# Patient Record
Sex: FEMALE | Race: WHITE
[De-identification: names, ages, dates, MRNs, and addresses within clinical notes are randomized per-mention and may not be internally consistent; named-entity substitution may affect disease eponyms.]

---

## 2024-12-16 ENCOUNTER — HOSPITAL ENCOUNTER (OUTPATIENT)
Dept: HOSPITAL 100 - LAB | Age: 34
Discharge: HOME | End: 2024-12-16
Payer: COMMERCIAL

## 2024-12-16 DIAGNOSIS — Z87.898: ICD-10-CM

## 2024-12-16 DIAGNOSIS — L30.4: ICD-10-CM

## 2024-12-16 DIAGNOSIS — L90.9: Primary | ICD-10-CM

## 2024-12-16 LAB
ANION GAP: 1 (ref 5–15)
BUN SERPL-MCNC: 11 MG/DL (ref 7–18)
BUN/CREAT RATIO: 11.7 RATIO (ref 10–20)
CALCIUM SERPL-MCNC: 9.2 MG/DL (ref 8.5–10.1)
CARBON DIOXIDE: 28 MMOL/L (ref 21–32)
CHLORIDE: 108 MMOL/L (ref 98–107)
DEPRECATED RDW RBC: 41.2 FL (ref 35.1–43.9)
ERYTHROCYTE [DISTWIDTH] IN BLOOD: 12.2 % (ref 11.6–14.6)
EST GLOM FILT RATE - AFR AMER: 87 ML/MIN (ref 60–?)
GLUCOSE: 86 MG/DL (ref 74–106)
HCT VFR BLD AUTO: 43.9 % (ref 37–47)
HEMOGLOBIN: 14.8 G/DL (ref 12–15)
HGB BLD-MCNC: 14.8 G/DL (ref 12–15)
MCV RBC: 91.5 FL (ref 81–99)
MEAN CORP HGB CONC: 33.7 G/DL (ref 32–36)
MEAN PLATELET VOL.: 10.4 FL (ref 6.2–12)
PLATELET # BLD: 267 K/MM3 (ref 150–450)
PLATELET COUNT: 267 K/MM3 (ref 150–450)
POTASSIUM: 4.1 MMOL/L (ref 3.5–5.1)
RBC # BLD AUTO: 4.8 M/MM3 (ref 4.2–5.4)
RBC DISTRIBUTION WIDTH CV: 12.2 % (ref 11.6–14.6)
RBC DISTRIBUTION WIDTH SD: 41.2 FL (ref 35.1–43.9)
WBC # BLD AUTO: 8 K/MM3 (ref 4.4–11)
WHITE BLOOD COUNT: 8 K/MM3 (ref 4.4–11)

## 2024-12-16 PROCEDURE — 85027 COMPLETE CBC AUTOMATED: CPT

## 2024-12-16 PROCEDURE — 80048 BASIC METABOLIC PNL TOTAL CA: CPT

## 2024-12-16 PROCEDURE — 36415 COLL VENOUS BLD VENIPUNCTURE: CPT

## 2025-02-20 NOTE — PAT.ANESEVAL
Pre-Assessment
Diagnosis/Proposed Procedure
Planned Operative Procedure(s): Abdominal Panniculectomy
Anesthesia History
Anesthesia History - RN Documentation: 
Anesthesia History - RN Documentation

Hx Hospitalization             Yes: 24 LAP [facility]      25 09:22
Any Problems With Anesthesia   Yes: NAUSEA                  25 09:22
Cholinesterase deficiency      No                           25 09:22
You/Your Family Experience     No                           25 09:22
fever (hyperthermia) with       
Relationship                    
Recent Exposure to Contagious   
Disease                         
Does patient have nerve        No                           25 09:22
stimulator                      
Patient instructed to have      
device shut off                 
--Does patient have Pacemaker   
or ICD?                         
When Was Last Pacemaker Check   


*** QUESTION #4 FULL TEXT: You/Your Family Experience fever (hyperthermia) with Anesthesia

Last Oral Intake
Last Oral intake: 
Last Oral Intake

NPO since                       
Meds taken in AM with sips of   
water?                          
Meds patient instructed to      
take am of surgery              



PONV
PONV - RN Documentation: 
PONV - RN Documentation

Female                         Yes                          25 09:22
HX of Motion Sickness          Yes                          25 09:22
HX of N/V After Surgery        Yes                          25 09:22
Non-Smoker                     Yes                          25 09:22
Duration of Surgery greater    Yes                          25 09:22
than 60 minutes                 
Number of Risk Factors         5                            25 09:22
PONV Score                     Severe Risk                  25 09:22



Height & Weight
Height & Weight: 
Anesthesia: Height & Weight

Height                         5 ft 6 in                    25 10:26



Respiratory Assessment
Respiratory Assessment - RN Documentation: 
Respiratory Tract Infection Hx - RN Documentation

Hx Respiratory Tract Infection No                           25 09:22



STOP Sleep Apnea
STOP Sleep Apnea - RN Documentation: 
STOP Sleep Apnea - RN Documentation

Hx Hypertension                No                           25 09:22
Hx Sleep Apnea                 No                           25 09:22
CPAP                            
BIPAP                           
Do you snore loudly (louder    No                           25 09:22
than talking or can be heard    
Do you often feel tired/       No                           25 09:22
fatigued/ sleepy during         
daytime?                        
Has anyone observed you stop   No                           25 09:22
breathing during sleep?         
STOP Results                   Negative                     25 09:22



*** QUESTION #5 FULL TEXT : Do you snore loudly (louder than talking or can be heard through closed doors)? 

Tobacco Use History
Tobacco Use History - RN Documentation: 
Tobacco Use History - RN Documentation

Tobacco Use                     
Smoking Status                 Never smoker                 25 09:22
Hx Tobacco Use                 No                           25 09:22
Years Smoking                   
Packs Smoked per Day            
Smoking Cessation Date was      
within the last 15 years        
Hx Smoking Cessation Date       
Hx Smoking Cessation            
Counseling                      



Hematologic Medial History
Hematologic Hx - RN Documentation: 
Hematologic Medical Hx - RN documentation

Hx of Blood Transfusion        Yes                          25 09:22
Hx of Transfusion in last 3    No                           25 09:22
Months                          
Date of Last Transfusion (if    
within last 3 months)           
Ever experience any problems   No                           25 09:22
with transfusion(s)?            
Specify any problems            
Hx of Preganancy in last 3     No                           25 09:22
Months                          
Nurse Filling Out Transfusion  VCHRISTIN                    25 09:22
& Pregnancy Questions:          
Date:                          25 09:22
Time:                          09:23                        25 09:22
Patient unable to answer at     
this time (ie. confused,        
unrespo                         



Pregnancy/Reproduction History
Pregnancy/Reproductive History - RN Documentation: 
Pregnancy/Reproductive Hx- RN Documentation

Hx Pregnant Now                No                           25 09:22
Gestational Age (in weeks):     
EDC:                            
Hx                       
Hx Para                         
Hx  Section             
SAB                             
Breastfeeding                  No                           25 09:22




Formerly [facility]
Medical History (Updated 25 @ 09:21 by Idalmis Mata)

Wears glasses
Post-menopausal
Cancer
Anxiety
Kidney stones
Migraine headache
Syncope
Cardiology follow-up encounter
History of Holter monitoring
History of echocardiogram
POTS (postural orthostatic tachycardia syndrome)
History of frequent headaches
Polycystic ovary
Hormone deficiency
History of melanoma


Home Medications

?Medication ?Instructions ?Recorded ?Last Taken ?Type
bupropion HCl 150 mg 24 hr tablet, 150 mg PO QAM 10/02/24 Unknown History
extended release    
desvenlafaxine succinate 25 mg 25 mg PO QDAY 10/02/24 Unknown History
tablet,extended release 24 hr    
spironolactone 100 mg tablet 100 mg PO QDAY 10/02/24 Unknown History
topiramate 50 mg capsule,extended 50 mg PO BID 10/02/24 Unknown History
release 24 hr    
sucralfate 1 gram tablet 1 g PO 4X/DAY 25 Unknown History
omeprazole 40 mg capsule,delayed 40 mg PO DAILY 25 Unknown History
release    
rizatriptan 10 mg tablet 10 mg PO PRN PRN migraine 25 Unknown History




Allergy/AdvReac Type Severity Reaction Status Date / Time
blueberry Allergy Severe Hives Verified 25 09:11
raspberry Allergy Severe Hives Verified 25 09:11
strawberry (strawberries) Allergy  Swelling Verified 25 09:10


Family History (Reviewed 10/02/24 @ 15:09 by Stephanie Mcghee)
Father
 Alcoholism
 Diabetes
Mother
 Anxiety


Surgical History (Updated 25 @ 09:21 by Idalmis Mata)

History of laparoscopic cholecystectomy
Hx of adenoidectomy
History of bilateral breast reduction surgery
History of hysterectomy
History of  delivery


Social History (Reviewed 10/02/24 @ 15:18 by Stephanie Bennett)
Smoking Status:  Never smoker 
alcohol intake:  never 
substance use type:  does not use 
additional social history:  no vaping, no marijuana, no edibles, no asa, ibuprofen prn 



Audit: Pertinent Findings
Pertinent Findings
EKG Perinent findings: unremarkable ECG in 
Additional pertinent findings: Normal pulmonary function 
METS>=4

Recommendation
Anesthesia Recommendation
Anesthesia recommendation: OPTIMIZED for anesthesia

## 2025-02-20 NOTE — PAT.ANE_ITS
Pre-Assessment    
Diagnosis/Proposed Procedure    
Planned Operative Procedure(s): Abdominal Panniculectomy    
Anesthesia History    
Anesthesia History - RN Documentation:     
                      Anesthesia History - RN Documentation    
    
    
    
Hx Hospitalization             Yes: 24 LAP STEFANY      25 09:22    
     
Any Problems With Anesthesia   Yes: NAUSEA                  25 09:22    
     
Cholinesterase deficiency      No                           25 09:22    
     
You/Your Family Experience     No                           25 09:22    
    
fever (hyperthermia) with           
     
Relationship                        
     
Recent Exposure to Contagious       
    
Disease                             
     
Does patient have nerve        No                           25 09:22    
    
stimulator                          
     
Patient instructed to have          
    
device shut off                     
     
--Does patient have Pacemaker       
    
or ICD?                             
     
When Was Last Pacemaker Check       
    
    
    
*** QUESTION #4 FULL TEXT: You/Your Family Experience fever (hyperthermia) with   
Anesthesia    
    
Last Oral Intake    
Last Oral intake:     
                                Last Oral Intake    
    
    
    
NPO since                           
     
Meds taken in AM with sips of       
    
water?                              
     
Meds patient instructed to          
    
take am of surgery                  
    
    
    
    
PONV    
PONV - RN Documentation:     
                             PONV - RN Documentation    
    
    
    
Female                         Yes                          25 09:22    
     
HX of Motion Sickness          Yes                          25 09:22    
     
HX of N/V After Surgery        Yes                          25 09:22    
     
Non-Smoker                     Yes                          25 09:22    
     
Duration of Surgery greater    Yes                          25 09:22    
    
than 60 minutes                     
     
Number of Risk Factors         5                            25 09:22    
     
PONV Score                     Severe Risk                  25 09:22    
    
    
    
    
Height & Weight    
Height & Weight:     
                           Anesthesia: Height & Weight    
    
    
    
Height                         5 ft 6 in                    25 10:26    
    
    
    
    
Respiratory Assessment    
Respiratory Assessment - RN Documentation:     
                Respiratory Tract Infection Hx - RN Documentation    
    
    
    
Hx Respiratory Tract Infection No                           25 09:22    
    
    
    
    
STOP Sleep Apnea    
STOP Sleep Apnea - RN Documentation:     
                       STOP Sleep Apnea - RN Documentation    
    
    
    
Hx Hypertension                No                           25 09:22    
     
Hx Sleep Apnea                 No                           25 09:22    
     
CPAP                                
     
BIPAP                               
     
Do you snore loudly (louder    No                           25 09:22    
    
than talking or can be heard        
     
Do you often feel tired/       No                           25 09:22    
    
fatigued/ sleepy during             
    
daytime?                            
     
Has anyone observed you stop   No                           25 09:22    
    
breathing during sleep?             
     
STOP Results                   Negative                     25 09:22    
    
    
    
    
*** QUESTION #5 FULL TEXT : Do you snore loudly (louder than talking or can be   
heard through closed doors)?     
    
Tobacco Use History    
Tobacco Use History - RN Documentation:     
                     Tobacco Use History - RN Documentation    
    
    
    
Tobacco Use                         
     
Smoking Status                 Never smoker                 25 09:22    
     
Hx Tobacco Use                 No                           25 09:22    
     
Years Smoking                       
     
Packs Smoked per Day                
     
Smoking Cessation Date was          
    
within the last 15 years            
     
Hx Smoking Cessation Date           
     
Hx Smoking Cessation                
    
Counseling                          
    
    
    
    
Hematologic Medial History    
Hematologic Hx - RN Documentation:     
                    Hematologic Medical Hx - RN documentation    
    
    
    
Hx of Blood Transfusion        Yes                          25 09:22    
     
Hx of Transfusion in last 3    No                           25 09:22    
    
Months                              
     
Date of Last Transfusion (if        
    
within last 3 months)               
     
Ever experience any problems   No                           25 09:22    
    
with transfusion(s)?                
     
Specify any problems                
     
Hx of Preganancy in last 3     No                           25 09:22    
    
Months                              
     
Nurse Filling Out Transfusion  VCHRISTIN                    25 09:22    
    
& Pregnancy Questions:              
     
Date:                          25 09:22    
     
Time:                          09:23                        25 09:22    
     
Patient unable to answer at         
    
this time (ie. confused,            
    
unrespo                             
    
    
    
    
Pregnancy/Reproduction History    
Pregnancy/Reproductive History - RN Documentation:     
                   Pregnancy/Reproductive Hx- RN Documentation    
    
    
    
Hx Pregnant Now                No                           25 09:22    
     
Gestational Age (in weeks):         
     
EDC:                                
     
Hx                           
     
Hx Para                             
     
Hx  Section                 
     
SAB                                 
     
Breastfeeding                  No                           25 09:22    
    
    
    
    
    
[facility] hospital    
Medical History (Updated 25 @ 09:21 by Idalmis Mata)    
    
Wears glasses    
Post-menopausal    
Cancer    
Anxiety    
Kidney stones    
Migraine headache    
Syncope    
Cardiology follow-up encounter    
History of Holter monitoring    
History of echocardiogram    
POTS (postural orthostatic tachycardia syndrome)    
History of frequent headaches    
Polycystic ovary    
Hormone deficiency    
History of melanoma    
    
    
                                Home Medications    
    
    
    
?Medication ?Instructions ?Recorded ?Last Taken ?Type    
     
                          bupropion HCl 150 mg 24 hr tablet, 150 mg PO QAM 10/02    
/24 Unknown History    
    
                                        extended release        
     
                          desvenlafaxine succinate 25 mg 25 mg PO QDAY 10/02/24     
Unknown History    
    
                                        tablet,extended release 24 hr        
     
                          spironolactone 100 mg tablet 100 mg PO QDAY 10/02/24 U    
nknown History    
     
                          topiramate 50 mg capsule,extended 50 mg PO BID 10/02/2    
4 Unknown History    
    
                                        release 24 hr        
     
                          sucralfate 1 gram tablet  1 g PO 4X/DAY 25 Unkno    
wn History    
     
                          omeprazole 40 mg capsule,delayed 40 mg PO DAILY  Unknown History    
    
                                        release        
     
                          rizatriptan 10 mg tablet  10 mg PO PRN PRN migraine  Unknown History    
    
    
    
                                            
    
    
    
Allergy/AdvReac Type Severity Reaction Status Date / Time    
     
blueberry Allergy Severe Hives Verified 25 09:11    
     
raspberry Allergy Severe Hives Verified 25 09:11    
     
strawberry (strawberries) Allergy  Swelling Verified 25 09:10    
    
    
    
Family History (Reviewed 10/02/24 @ 15:09 by Stephanie Mcghee)    
Father   Alcoholism    
   Diabetes    
Mother   Anxiety    
    
    
Surgical History (Updated 25 @ 09:21 by Idalmis Mata)    
    
History of laparoscopic cholecystectomy    
Hx of adenoidectomy    
History of bilateral breast reduction surgery    
History of hysterectomy    
History of  delivery    
    
    
Social History (Reviewed 10/02/24 @ 15:18 by Stephanie Bennett)    
Smoking Status:  Never smoker     
alcohol intake:  never     
substance use type:  does not use     
additional social history:  no vaping, no marijuana, no edibles, no asa,   
ibuprofen prn     
    
    
    
Audit: Pertinent Findings    
Pertinent Findings    
EKG Perinent findings: unremarkable ECG in     
Additional pertinent findings: Normal pulmonary function     
METS>=4    
    
Recommendation    
Anesthesia Recommendation    
Anesthesia recommendation: OPTIMIZED for anesthesia

## 2025-03-06 ENCOUNTER — HOSPITAL ENCOUNTER (OUTPATIENT)
Dept: HOSPITAL 100 - SDC | Age: 35
Discharge: HOME | End: 2025-03-06
Payer: COMMERCIAL

## 2025-03-06 VITALS — BODY MASS INDEX: 30.2 KG/M2

## 2025-03-06 VITALS
OXYGEN SATURATION: 96 % | SYSTOLIC BLOOD PRESSURE: 98 MMHG | HEART RATE: 63 BPM | RESPIRATION RATE: 16 BRPM | DIASTOLIC BLOOD PRESSURE: 55 MMHG

## 2025-03-06 VITALS
OXYGEN SATURATION: 97 % | RESPIRATION RATE: 16 BRPM | SYSTOLIC BLOOD PRESSURE: 98 MMHG | TEMPERATURE: 98.96 F | HEART RATE: 71 BPM | DIASTOLIC BLOOD PRESSURE: 65 MMHG

## 2025-03-06 VITALS
SYSTOLIC BLOOD PRESSURE: 98 MMHG | RESPIRATION RATE: 16 BRPM | OXYGEN SATURATION: 100 % | DIASTOLIC BLOOD PRESSURE: 68 MMHG | TEMPERATURE: 97.7 F | HEART RATE: 55 BPM

## 2025-03-06 VITALS
DIASTOLIC BLOOD PRESSURE: 68 MMHG | OXYGEN SATURATION: 100 % | SYSTOLIC BLOOD PRESSURE: 98 MMHG | HEART RATE: 55 BPM | TEMPERATURE: 97.7 F | RESPIRATION RATE: 16 BRPM

## 2025-03-06 VITALS
OXYGEN SATURATION: 95 % | HEART RATE: 61 BPM | RESPIRATION RATE: 16 BRPM | DIASTOLIC BLOOD PRESSURE: 68 MMHG | SYSTOLIC BLOOD PRESSURE: 89 MMHG

## 2025-03-06 VITALS
SYSTOLIC BLOOD PRESSURE: 98 MMHG | OXYGEN SATURATION: 98 % | TEMPERATURE: 98.78 F | HEART RATE: 70 BPM | RESPIRATION RATE: 16 BRPM | DIASTOLIC BLOOD PRESSURE: 54 MMHG

## 2025-03-06 VITALS
DIASTOLIC BLOOD PRESSURE: 68 MMHG | RESPIRATION RATE: 16 BRPM | SYSTOLIC BLOOD PRESSURE: 95 MMHG | TEMPERATURE: 97.7 F | HEART RATE: 71 BPM | OXYGEN SATURATION: 97 %

## 2025-03-06 VITALS
HEART RATE: 69 BPM | SYSTOLIC BLOOD PRESSURE: 98 MMHG | DIASTOLIC BLOOD PRESSURE: 49 MMHG | OXYGEN SATURATION: 92 % | TEMPERATURE: 97 F | RESPIRATION RATE: 16 BRPM

## 2025-03-06 VITALS
OXYGEN SATURATION: 96 % | SYSTOLIC BLOOD PRESSURE: 95 MMHG | DIASTOLIC BLOOD PRESSURE: 49 MMHG | RESPIRATION RATE: 16 BRPM | HEART RATE: 61 BPM | TEMPERATURE: 96.98 F

## 2025-03-06 VITALS
OXYGEN SATURATION: 96 % | SYSTOLIC BLOOD PRESSURE: 90 MMHG | DIASTOLIC BLOOD PRESSURE: 68 MMHG | HEART RATE: 63 BPM | RESPIRATION RATE: 16 BRPM

## 2025-03-06 VITALS
RESPIRATION RATE: 16 BRPM | DIASTOLIC BLOOD PRESSURE: 68 MMHG | OXYGEN SATURATION: 94 % | SYSTOLIC BLOOD PRESSURE: 98 MMHG | HEART RATE: 68 BPM

## 2025-03-06 VITALS — SYSTOLIC BLOOD PRESSURE: 98 MMHG | DIASTOLIC BLOOD PRESSURE: 68 MMHG

## 2025-03-06 DIAGNOSIS — Z87.898: ICD-10-CM

## 2025-03-06 DIAGNOSIS — L30.4: ICD-10-CM

## 2025-03-06 DIAGNOSIS — L90.9: Primary | ICD-10-CM

## 2025-03-06 DIAGNOSIS — F41.9: ICD-10-CM

## 2025-03-06 DIAGNOSIS — Z79.899: ICD-10-CM

## 2025-03-06 PROCEDURE — 00802 ANES PX LWR ABDL WAL PANNIC: CPT

## 2025-03-06 PROCEDURE — 0JB80ZZ EXCISION OF ABDOMEN SUBCUTANEOUS TISSUE AND FASCIA, OPEN APPROACH: ICD-10-PCS | Performed by: PLASTIC SURGERY

## 2025-03-06 PROCEDURE — 15830 EXC EXCESSIVE SKIN ABDOMEN: CPT

## 2025-03-06 RX ADMIN — CEFAZOLIN 400 GM: 10 INJECTION, POWDER, FOR SOLUTION INTRAVENOUS at 07:38

## 2025-03-06 NOTE — PCM.POSTANE2
Anesthesia Postop Eval I Sum
Postop Eval Completion status
Anesthesia document: Postop Eval 1 completed: Yes
Anesthesia Postop Eval I Summary
Anesthesia Postop Eval I Summary: 
Anesthesia Postop Eval I:  Assessment Summary

Airway patent                  Yes                03/06/25 11:03 [email or web address]
Spontaneous unlabored          Yes                03/06/25 11:03 [email or web address]
respirations                     
Mental status                  Awake,Calm         03/06/25 11:03 [email or web address]
nausea                         No                 03/06/25 11:03 [email or web address]
Vomiting                       No                 03/06/25 11:03 [email or web address]


Anesthesia Postop Eval I: Fluid Summary

Crystalloid volume administer  1,700              03/06/25 11:03 [email or web address]
(ml)                             
Colloids volume administered (   
ml)                              
Blood Product volume             
administered (ml)                
Total IV fluid infused         1,700              03/06/25 11:03 [email or web address]


Anesthesia Postop Eval I: Summary Notes

Anesthesia Complication        No                 03/06/25 11:03 [email or web address]
Anesthesia Complication          
Comment:                         
Post-operative progress note     




Anesthesia: Postop Eval II
Evaluation
Mental status: Awake and Calm
Pain Level: 3
nausea: No
Vomiting: No
Complications
Anesthesia Complication: No

## 2025-03-06 NOTE — EX.PCM.DISCH
Discharge Instructions
Dressing / Incision
Additional Dressing/Incision Instructions:: Follow the instructions given in the office. 
Maintain a recliner position when resting.
Follow Up Care
Please Follow Up With: Isabel Lopez MD
When: In 1 week
Test Results: 
Test results from this visit will be discussed in further detail at your follow-up appointment, if applicable.


Discharge Plan
Admission
Attending Provider: Isabel Lopez

Primary Care Provider: HEATHER ALVAREZ

Instructions
Print Language: English

Discharge Orders/Prescriptions
Prescriptions:
No Action
  desvenlafaxine succinate 25 mg tablet extended release 24 hr 
   25 mg PO QDAY 
  spironolactone 100 mg tablet 
   100 mg PO QDAY 
  topiramate 50 mg capsule,extended release 24hr 
   50 mg PO BID 
  bupropion HCl 150 mg tablet extended release 24 hr 
   150 mg PO QAM 
  sucralfate 1 gram tablet 
   1 g PO 4X/DAY 
  estradiol 0.5 mg tablet 
   0.5 mg PO QDAY 
  cephalexin 500 mg capsule 
   500 mg PO BID Qty: 14 0RF
  omeprazole 40 mg capsule,delayed release(DR/EC) 
   40 mg PO DAILY 
  rizatriptan 10 mg tablet 
   10 mg PO PRN PRN (Reason: migraine) 

Referrals / Follow Up:
HEATHER ALVAREZ MD [Primary Care Provider] - 

Disposition
Disposition (needs filled in before D/C Order can be placed): Home, Self Care

## 2025-03-06 NOTE — POSTOP.ANE_ITS
Anesthesia: Postop Eval I    
Current Vital Signs    
Temperature: 97 F    
Pulse Rate: 61    
Blood Pressure: 95/49    
Respiratory Rate: 16    
Pulse Ox: 96    
Oxygen Delivery Method: Room Air    
Assessment    
Airway patent: Yes    
Spontaneous unlabored respirations: Yes    
Mental status: Awake and Calm    
nausea: No    
Vomiting: No    
Anesthesia Complication: No    
Fluid Hydration    
Crystalloid volume administer (ml): 1,700    
Total IV fluid infused: 1,700    
Progress Note    
Anesthesia document: Postop Eval 1 completed: Yes

## 2025-03-06 NOTE — PRE.ANES_ITS
ASA Classification*    
ASA Classification    
ASA Classification: 2    
    
Assessment & Plan Anesthesia*    
Anesthesia Assessment    
Anesthesia Assessment:     
    
Discussed sedation and/or anesthesia options, risks, benefits, and alternatives   
with patient/parents/legal guardian/POA. Questions invited.     
    
The patient/parents/legal guardian/POA seems to understand and agrees to proceed  
with anesthesia plan.    
    
Reviewed the physical assessment, medical history, allergy history and patient   
home medications list prior to surgery/procedure/anesthetic and documented any   
changes.    
    
Performed airway and anesthesia risk assessments.    
    
Anesthesia Type    
Anesthesia Type: General    
History Source    
History Obtained from:: Patient and Chart    
    
Anesthesia Focused Assessment*    
Temperature: 97.7 F    
Pulse Rate: 55    
Blood Pressure: 98/68    
Respiratory Rate: 16    
Pulse Ox: 100    
Oxygen Delivery Method: Room Air    
Airway Assessment    
Mouth opens: >3 cm    
Mallampati Score: I    
Teeth Condition: Intact    
Neck Range of motion (ROM): Full ROM    
    
Focused Labs    
Anesthesia Preop lab:     
    
    
                                                    *** CBC ***    
     
                WBC             8.0 K/mm3 (4.4-11.0)  24 14:04  24    
     
                RBC             4.80 M/mm3 (4.2-5.4)  24 14:04  24    
     
                Hgb             14.8 g/dL (12.0-15.0)  24 14:04  24    
     
                Hct             43.9 % (37-47)  24 14:04  24    
     
                Plt Count       267 K/mm3 (150-450)  24 14:04  24    
     
                                                    *** CHEMISTRY ***    
     
                Potassium       4.1 mmol/L (3.5-5.1)  24 14:04  24    
     
                Sodium          138 mmol/L (136-145)  24 14:04  24    
     
                BUN             11 mg/dL (7-18)  24 14:04  24    
     
                Creatinine      0.94 mg/dL (0.55-1.02)  24 14:04  24    
     
                Glucose         86 mg/dL ()  24 14:04  24    
     
                                                    *** COAG ***    
     
                                                    *** PREGNANCY ***    
    
    
    
Pre-Assessment    
Diagnosis/Proposed Procedure    
Planned Operative Procedure(s): Abdominal Panniculectomy    
Anesthesia History    
Anesthesia History - RN Documentation:     
                      Anesthesia History - RN Documentation    
    
    
    
Hx Hospitalization             Yes: 24 LAP STEFANY      25 09:22    
     
Any Problems With Anesthesia   Yes: NAUSEA                  25 09:22    
     
Cholinesterase deficiency      No                           25 09:22    
     
You/Your Family Experience     No                           25 09:22    
    
fever (hyperthermia) with           
     
Relationship                        
     
Recent Exposure to Contagious  No                           25 06:37    
    
Disease                             
     
Does patient have nerve        No                           25 09:22    
    
stimulator                          
     
Patient instructed to have          
    
device shut off                     
     
--Does patient have Pacemaker  No                           25 06:37    
    
or ICD?                             
     
When Was Last Pacemaker Check       
    
    
    
*** QUESTION #4 FULL TEXT: You/Your Family Experience fever (hyperthermia) with   
Anesthesia    
    
Last Oral Intake    
Last Oral intake:     
                                Last Oral Intake    
    
    
    
NPO since                      19:30                        25 06:37    
     
Meds taken in AM with sips of       
    
water?                              
     
Meds patient instructed to          
    
take am of surgery                  
    
    
    
    
PONV    
PONV - RN Documentation:     
                             PONV - RN Documentation    
    
    
    
Female                         Yes                          25 09:22    
     
HX of Motion Sickness          Yes                          25 09:22    
     
HX of N/V After Surgery        Yes                          25 09:22    
     
Non-Smoker                     Yes                          25 09:22    
     
Duration of Surgery greater    Yes                          25 09:22    
    
than 60 minutes                     
     
Number of Risk Factors         5                            25 09:22    
     
PONV Score                     Severe Risk                  25 09:22    
    
    
    
    
Height & Weight    
Height & Weight:     
                           Anesthesia: Height & Weight    
    
    
    
Height                         5 ft 6 in                    25 06:37    
     
Weight:                        85 kg                        25 06:37    
     
Body Mass Index (BMI)          30.2                         25 06:37    
    
    
    
    
Respiratory Assessment    
Respiratory Assessment - RN Documentation:     
                Respiratory Tract Infection Hx - RN Documentation    
    
    
    
Hx Respiratory Tract Infection No                           25 09:22    
    
    
    
    
STOP Sleep Apnea    
STOP Sleep Apnea - RN Documentation:     
                       STOP Sleep Apnea - RN Documentation    
    
    
    
Hx Hypertension                No                           25 09:22    
     
Hx Sleep Apnea                 No                           25 09:22    
     
CPAP                                
     
BIPAP                               
     
Do you snore loudly (louder    No                           25 09:22    
    
than talking or can be heard        
     
Do you often feel tired/       No                           25 09:22    
    
fatigued/ sleepy during             
    
daytime?                            
     
Has anyone observed you stop   No                           25 09:22    
    
breathing during sleep?             
     
STOP Results                   Negative                     25 09:22    
    
    
    
    
*** QUESTION #5 FULL TEXT : Do you snore loudly (louder than talking or can be   
heard through closed doors)?     
    
Tobacco Use History    
Tobacco Use History - RN Documentation:     
                     Tobacco Use History - RN Documentation    
    
    
    
Tobacco Use                         
     
Smoking Status                 Never smoker                 25 09:22    
     
Hx Tobacco Use                 No                           25 09:22    
     
Years Smoking                       
     
Packs Smoked per Day                
     
Smoking Cessation Date was          
    
within the last 15 years            
     
Hx Smoking Cessation Date           
     
Hx Smoking Cessation                
    
Counseling                          
    
    
    
    
Hematologic Medial History    
Hematologic Hx - RN Documentation:     
                    Hematologic Medical Hx - RN documentation    
    
    
    
Hx of Blood Transfusion        Yes                          25 09:22    
     
Hx of Transfusion in last 3    No                           25 09:22    
    
Months                              
     
Date of Last Transfusion (if        
    
within last 3 months)               
     
Ever experience any problems   No                           25 09:22    
    
with transfusion(s)?                
     
Specify any problems                
     
Hx of Preganancy in last 3     No                           25 09:22    
    
Months                              
     
Nurse Filling Out Transfusion  VCHRISTIN                    25 09:22    
    
& Pregnancy Questions:              
     
Date:                          25 09:22    
     
Time:                          09:23                        25 09:22    
     
Patient unable to answer at         
    
this time (ie. confused,            
    
unrespo                             
    
    
    
    
Pregnancy/Reproduction History    
Pregnancy/Reproductive History - RN Documentation:     
                   Pregnancy/Reproductive Hx- RN Documentation    
    
    
    
Hx Pregnant Now                No                           25 09:22    
     
Gestational Age (in weeks):         
     
EDC:                                
     
Hx                           
     
Hx Para                             
     
Hx  Section                 
     
SAB                                 
     
Breastfeeding                  No                           25 09:22    
    
    
    
    
Active Medications    
Active Medications:     
Current Medications    
    
    
    
Generic Name Dose Route Start Last Admin    
    
  Trade Name Freq  PRN Reason Stop Dose Admin    
     
Cefazolin Sodium 2 gm/ N/A  20 mls @ 400 mls/hr  25 07:30     
    
  IV  25 07:32     
    
  PREOP ONE      
     
Sodium Chloride  1,000 mls @ 15 mls/hr  25 06:10     
    
  IV  25 19:29     
    
  .Q48H [facility]      
    
  Protocol      
    
    
    
    
PFSH    
Medical History (Reviewed 25 @ 07:25 by Dr. Christophe Preciado MD)    
    
Wears glasses    
Post-menopausal    
Cancer    
Anxiety    
Kidney stones    
Migraine headache    
Syncope    
Cardiology follow-up encounter    
History of Holter monitoring    
History of echocardiogram    
POTS (postural orthostatic tachycardia syndrome)    
History of frequent headaches    
Polycystic ovary    
Hormone deficiency    
History of melanoma    
    
    
                                Home Medications    
    
    
    
?Medication ?Instructions ?Recorded ?Last Taken ?Type    
     
                          bupropion HCl 150 mg 24 hr tablet, 150 mg PO QAM 10/02    
/24 03/05/25 History    
    
                                        extended release        
     
                          desvenlafaxine succinate 25 mg 25 mg PO QDAY 10/02/24     
03/05/25 History    
    
                                        tablet,extended release 24 hr        
     
                          spironolactone 100 mg tablet 100 mg PO QDAY 10/02/24 0    
3/05/25 History    
     
                          topiramate 50 mg capsule,extended 50 mg PO BID 10/02/2    
4 25 08:36 History    
    
                                        release 24 hr        
     
                          sucralfate 1 gram tablet  1 g PO 4X/DAY 25 History    
     
                          omeprazole 40 mg capsule,delayed 40 mg PO DAILY 25 08:35 History    
    
                                        release        
     
                          rizatriptan 10 mg tablet  10 mg PO PRN PRN migraine  Unknown History    
     
                          cephalexin 500 mg capsule 500 mg PO BID #14 caps 25 20:33 Rx    
     
                          estradiol 0.5 mg tablet   0.5 mg PO QDAY 25 Unkn    
own History    
    
    
    
                                            
    
    
    
Allergy/AdvReac Type Severity Reaction Status Date / Time    
     
blueberry Allergy Severe Hives Verified 25 06:33    
     
raspberry Allergy Severe Hives Verified 25 06:33    
     
strawberry (strawberries) Allergy  Swelling Verified 25 06:33    
    
    
    
Family History (Reviewed 25 @ 07:25 by Dr. Christophe Preciado MD)    
Father   Alcoholism    
   Diabetes    
Mother   Anxiety    
    
    
Surgical History (Reviewed 25 @ 07:25 by Dr. Christophe Preciado MD)    
    
History of laparoscopic cholecystectomy    
Hx of adenoidectomy    
History of bilateral breast reduction surgery    
History of hysterectomy    
History of  delivery    
    
    
Social History (Reviewed 25 @ 07:25 by Dr. Christophe Preciado MD)    
Smoking Status:  Never smoker     
alcohol intake:  never     
substance use type:  does not use     
additional social history:  no vaping, no marijuana, no edibles, no asa,   
ibuprofen prn     
    
    
    
Review of Systems (Anesthesia)    
ROS Narrative    
System reviewed and no additional complaints, except as documented.

## 2025-03-06 NOTE — PCM.OPRPT
Problems
Associated Problem List Diagnoses
(1) Atrophic skin: 
(2) History of weight loss: 
(3) Intertrigo:

Operative Report (Standard)
Operative Information
Date of Procedure: 03/06/25
Pre-Operative Diagnosis: Abdominal panniculus, intertrigo
Post-Operative Diagnosis: The same
Surgery/Procedure Performed: Panniculectomy (1106 g)
FIRST Assistant: Yes First Assistant: Mike West  
Tasks completed by first assist: Closing and Retracting
Type of Anesthesia: General
RN Documented Start/Stop Times: 



Operation Date: 03/06/25 07:30
Case Time  
Into Pre-Op 03/06/25 06:08
Out of Pre-Op 03/06/25 07:28
Anesthesia Start 03/06/25 07:30
Into Room 03/06/25 07:30
Procedure Start 03/06/25 08:01
Procedure End 03/06/25 10:52
Anesthesia End 03/06/25 10:53
Out of Room 03/06/25 10:53



Procedure Start Time: 08:01
Procedure Stop Time: 10:52
Select all DRAINS/GRAFTS/IMPLANTS that apply: None
Estimated Blood Loss: 30 cc
Specimen collected: No
Description of surgery: 
The patient presents with recurrent intertrigo and an abdominal panniculus having lost a significant amount of weight and status post pregnancies.  She presents for excision of the lower abdominal skin to relieve the symptoms.  The procedure been 
thoroughly reviewed with the patient.  The incisions and scars as well as limitations were reviewed.  The patient is marked in the preop holding area prior to surgery.  An informed consent is obtained.

The patient is brought to the operating room and placed under general anesthesia in the supine position.  Care is taken to pad all pressure points, apply a warming blanket, sequential compression stockings, and a Garcia catheter.  The abdomen is 
prepped and draped in usual sterile fashion.  Initially began with making the premarked incision.  This is carried down through the subcutaneous tissue to the fascia of the abdominal wall.  Dissection then is carefully continued cephalad ensuring 
the fascia remained intact.  Once the premarked skin was undermined, the wound was irrigated with antibiotic solution and checked for hemostasis which was controlled with cautery.  The patient is placed in a semi-Fowlers position and the redundant 
skin is marked.  With a sterile towel placed beneath this skin, the skin was excised and passed off the operative field to be weighed.  Hemostasis was controlled with argon coagulation.  The wound is then tacked together with skin clips.  With good 
alignment noted, some Vicryl sutures are placed in the subcutaneous tissue.  The skin and subcutaneous tissue were then closed in 3 layers with a STRATAFIX suture.  Minor revisions of dogear redundant skin is performed on the lateral aspects of the 
dissection.  The skin is closed with a running subcuticular strata fix suture.  1/4% plain Marcaine is injected along the incision.  Xeroform gauze is placed on the incisions followed by ABDs which are taped in place.  The patient is then placed in 
an abdominal binder.  She tolerated the procedure well was taken to the recovery area in an awake and stable condition.  Needle and sponge counts are correct.
Surgical Findings: 
As above
Complications
Complications: No
Admit VTE Documentation
VTE Mechan Device Prophylaxis: SCD's

## 2025-03-06 NOTE — DCINST_ITS
Discharge Instructions    
Dressing / Incision    
Additional Dressing/Incision Instructions:: Follow the instructions given in the  
office.     
Maintain a recliner position when resting.    
Follow Up Care    
Please Follow Up With: Isabel Lopez MD    
When: In 1 week    
Test Results:     
Test results from this visit will be discussed in further detail at your follow-  
up appointment, if applicable.    
    
    
Discharge Plan    
Admission    
Attending Provider: Isabel Lopez    
    
Primary Care Provider: HEATHER ALVAREZ    
    
Instructions    
Print Language: English    
    
Discharge Orders/Prescriptions    
Prescriptions:    
No Action    
  desvenlafaxine succinate 25 mg tablet extended release 24 hr     
   25 mg PO QDAY     
  spironolactone 100 mg tablet     
   100 mg PO QDAY     
  topiramate 50 mg capsule,extended release 24hr     
   50 mg PO BID     
  bupropion HCl 150 mg tablet extended release 24 hr     
   150 mg PO QAM     
  sucralfate 1 gram tablet     
   1 g PO 4X/DAY     
  estradiol 0.5 mg tablet     
   0.5 mg PO QDAY     
  cephalexin 500 mg capsule     
   500 mg PO BID Qty: 14 0RF    
  omeprazole 40 mg capsule,delayed release(DR/EC)     
   40 mg PO DAILY     
  rizatriptan 10 mg tablet     
   10 mg PO PRN PRN (Reason: migraine)     
    
Referrals / Follow Up:    
HEATHER ALVAREZ MD [Primary Care Provider] -     
    
Disposition    
Disposition (needs filled in before D/C Order can be placed): Home, Self Care

## 2025-03-06 NOTE — OP.PCM_ITS
Problems    
Associated Problem List Diagnoses    
(1) Atrophic skin:     
(2) History of weight loss:     
(3) Intertrigo:    
    
Operative Report (Standard)    
Operative Information    
Date of Procedure: 03/06/25    
Pre-Operative Diagnosis: Abdominal panniculus, intertrigo    
Post-Operative Diagnosis: The same    
Surgery/Procedure Performed: Panniculectomy (1106 g)    
FIRST Assistant: Yes First Assistant: Mike West      
Tasks completed by first assist: Closing and Retracting    
Type of Anesthesia: General    
RN Documented Start/Stop Times:     
    
                                            
    
                         Operation Date: 03/06/25 07:30    
    
    
Case Time      
    
Into Pre-Op 03/06/25 06:08    
    
Out of Pre-Op 03/06/25 07:28    
    
Anesthesia Start 03/06/25 07:30    
    
Into Room 03/06/25 07:30    
    
Procedure Start 03/06/25 08:01    
    
Procedure End 03/06/25 10:52    
    
Anesthesia End 03/06/25 10:53    
    
Out of Room 03/06/25 10:53    
    
    
    
    
Procedure Start Time: 08:01    
Procedure Stop Time: 10:52    
Select all DRAINS/GRAFTS/IMPLANTS that apply: None    
Estimated Blood Loss: 30 cc    
Specimen collected: No    
Description of surgery:     
The patient presents with recurrent intertrigo and an abdominal panniculus   
having lost a significant amount of weight and status post pregnancies.  She   
presents for excision of the lower abdominal skin to relieve the symptoms.  The   
procedure been thoroughly reviewed with the patient.  The incisions and scars as  
well as limitations were reviewed.  The patient is marked in the preop holding   
area prior to surgery.  An informed consent is obtained.    
    
The patient is brought to the operating room and placed under general anesthesia  
in the supine position.  Care is taken to pad all pressure points, apply a   
warming blanket, sequential compression stockings, and a Garcia catheter.  The   
abdomen is prepped and draped in usual sterile fashion.  Initially began with   
making the premarked incision.  This is carried down through the subcutaneous   
tissue to the fascia of the abdominal wall.  Dissection then is carefully   
continued cephalad ensuring the fascia remained intact.  Once the premarked skin  
was undermined, the wound was irrigated with antibiotic solution and checked for  
hemostasis which was controlled with cautery.  The patient is placed in a semi-  
Fowlers position and the redundant skin is marked.  With a sterile towel placed   
beneath this skin, the skin was excised and passed off the operative field to be  
weighed.  Hemostasis was controlled with argon coagulation.  The wound is then   
tacked together with skin clips.  With good alignment noted, some Vicryl sutures  
are placed in the subcutaneous tissue.  The skin and subcutaneous tissue were   
then closed in 3 layers with a STRATAFIX suture.  Minor revisions of dogear   
redundant skin is performed on the lateral aspects of the dissection.  The skin   
is closed with a running subcuticular strata fix suture.  1/4% plain Marcaine is  
injected along the incision.  Xeroform gauze is placed on the incisions followed  
by ABDs which are taped in place.  The patient is then placed in an abdominal   
binder.  She tolerated the procedure well was taken to the recovery area in an   
awake and stable condition.  Needle and sponge counts are correct.    
Surgical Findings:     
As above    
Complications    
Complications: No    
Admit VTE Documentation    
VTE Mechan Device Prophylaxis: SCD's

## 2025-03-06 NOTE — PCM.PRE.AN2
ASA Classification*
ASA Classification
ASA Classification: 2

Assessment & Plan Anesthesia*
Anesthesia Assessment
Anesthesia Assessment: 

Discussed sedation and/or anesthesia options, risks, benefits, and alternatives with patient/parents/legal guardian/POA. Questions invited. 

The patient/parents/legal guardian/POA seems to understand and agrees to proceed with anesthesia plan.

Reviewed the physical assessment, medical history, allergy history and patient home medications list prior to surgery/procedure/anesthetic and documented any changes.

Performed airway and anesthesia risk assessments.

Anesthesia Type
Anesthesia Type: General
History Source
History Obtained from:: Patient and Chart

Anesthesia Focused Assessment*
Temperature: 97.7 F
Pulse Rate: 55
Blood Pressure: 98/68
Respiratory Rate: 16
Pulse Ox: 100
Oxygen Delivery Method: Room Air
Airway Assessment
Mouth opens: >3 cm
Mallampati Score: I
Teeth Condition: Intact
Neck Range of motion (ROM): Full ROM

Focused Labs
Anesthesia Preop lab: 
       *** CBC ***  
      WBC 8.0 K/mm3 (4.4-11.0)  24 14:04 24
      RBC 4.80 M/mm3 (4.2-5.4)  24 14:04 24
      Hgb 14.8 g/dL (12.0-15.0)  24 14:04 24
      Hct 43.9 % (37-47)  24 14:04 24
      Plt Count 267 K/mm3 (150-450)  24 14:04 24
       *** CHEMISTRY ***  
      Potassium 4.1 mmol/L (3.5-5.1)  24 14:04 24
      Sodium 138 mmol/L (136-145)  24 14:04 24
      BUN 11 mg/dL (7-18)  24 14:04 24
      Creatinine 0.94 mg/dL (0.55-1.02)  24 14:04 24
      Glucose 86 mg/dL ()  24 14:04 24
       *** COAG ***  
       *** PREGNANCY ***  


Pre-Assessment
Diagnosis/Proposed Procedure
Planned Operative Procedure(s): Abdominal Panniculectomy
Anesthesia History
Anesthesia History - RN Documentation: 
Anesthesia History - RN Documentation

Hx Hospitalization             Yes: 24 LAP STEFANY      25 09:22
Any Problems With Anesthesia   Yes: NAUSEA                  25 09:22
Cholinesterase deficiency      No                           25 09:22
You/Your Family Experience     No                           25 09:22
fever (hyperthermia) with       
Relationship                    
Recent Exposure to Contagious  No                           25 06:37
Disease                         
Does patient have nerve        No                           25 09:22
stimulator                      
Patient instructed to have      
device shut off                 
--Does patient have Pacemaker  No                           25 06:37
or ICD?                         
When Was Last Pacemaker Check   


*** QUESTION #4 FULL TEXT: You/Your Family Experience fever (hyperthermia) with Anesthesia

Last Oral Intake
Last Oral intake: 
Last Oral Intake

NPO since                      19:30                        25 06:37
Meds taken in AM with sips of   
water?                          
Meds patient instructed to      
take am of surgery              



PONV
PONV - RN Documentation: 
PONV - RN Documentation

Female                         Yes                          25 09:22
HX of Motion Sickness          Yes                          25 09:22
HX of N/V After Surgery        Yes                          25 09:22
Non-Smoker                     Yes                          25 09:22
Duration of Surgery greater    Yes                          25 09:22
than 60 minutes                 
Number of Risk Factors         5                            25 09:22
PONV Score                     Severe Risk                  25 09:22



Height & Weight
Height & Weight: 
Anesthesia: Height & Weight

Height                         5 ft 6 in                    25 06:37
Weight:                        85 kg                        25 06:37
Body Mass Index (BMI)          30.2                         25 06:37



Respiratory Assessment
Respiratory Assessment - RN Documentation: 
Respiratory Tract Infection Hx - RN Documentation

Hx Respiratory Tract Infection No                           25 09:22



STOP Sleep Apnea
STOP Sleep Apnea - RN Documentation: 
STOP Sleep Apnea - RN Documentation

Hx Hypertension                No                           25 09:22
Hx Sleep Apnea                 No                           25 09:22
CPAP                            
BIPAP                           
Do you snore loudly (louder    No                           25 09:22
than talking or can be heard    
Do you often feel tired/       No                           25 09:22
fatigued/ sleepy during         
daytime?                        
Has anyone observed you stop   No                           25 09:22
breathing during sleep?         
STOP Results                   Negative                     25 09:22



*** QUESTION #5 FULL TEXT : Do you snore loudly (louder than talking or can be heard through closed doors)? 

Tobacco Use History
Tobacco Use History - RN Documentation: 
Tobacco Use History - RN Documentation

Tobacco Use                     
Smoking Status                 Never smoker                 25 09:22
Hx Tobacco Use                 No                           25 09:22
Years Smoking                   
Packs Smoked per Day            
Smoking Cessation Date was      
within the last 15 years        
Hx Smoking Cessation Date       
Hx Smoking Cessation            
Counseling                      



Hematologic Medial History
Hematologic Hx - RN Documentation: 
Hematologic Medical Hx - RN documentation

Hx of Blood Transfusion        Yes                          25 09:22
Hx of Transfusion in last 3    No                           25 09:22
Months                          
Date of Last Transfusion (if    
within last 3 months)           
Ever experience any problems   No                           25 09:22
with transfusion(s)?            
Specify any problems            
Hx of Preganancy in last 3     No                           25 09:22
Months                          
Nurse Filling Out Transfusion  VCHRISTIN                    25 09:22
& Pregnancy Questions:          
Date:                          25 09:22
Time:                          09:23                        25 09:22
Patient unable to answer at     
this time (ie. confused,        
unrespo                         



Pregnancy/Reproduction History
Pregnancy/Reproductive History - RN Documentation: 
Pregnancy/Reproductive Hx- RN Documentation

Hx Pregnant Now                No                           25 09:22
Gestational Age (in weeks):     
EDC:                            
Hx                       
Hx Para                         
Hx  Section             
SAB                             
Breastfeeding                  No                           25 09:22



Active Medications
Active Medications: 
Current Medications

Generic Name Dose Route Start Last Admin
  Trade Name Freq  PRN Reason Stop Dose Admin
Cefazolin Sodium 2 gm/ N/A  20 mls @ 400 mls/hr  25 07:30 
  IV  25 07:32 
  PREOP ONE  
Sodium Chloride  1,000 mls @ 15 mls/hr  25 06:10 
  IV  25 19:29 
  .[id number] [facility]  
  Protocol  



PFSH
Medical History (Reviewed 25 @ 07:25 by Dr. Christophe Preciado MD)

Wears glasses
Post-menopausal
Cancer
Anxiety
Kidney stones
Migraine headache
Syncope
Cardiology follow-up encounter
History of Holter monitoring
History of echocardiogram
POTS (postural orthostatic tachycardia syndrome)
History of frequent headaches
Polycystic ovary
Hormone deficiency
History of melanoma


Home Medications

?Medication ?Instructions ?Recorded ?Last Taken ?Type
bupropion HCl 150 mg 24 hr tablet, 150 mg PO QAM 10/02/24 03/05/25 History
extended release    
desvenlafaxine succinate 25 mg 25 mg PO QDAY 10/02/24 03/05/25 History
tablet,extended release 24 hr    
spironolactone 100 mg tablet 100 mg PO QDAY 10/02/24 03/05/25 History
topiramate 50 mg capsule,extended 50 mg PO BID 10/02/24 03/05/25 08:36 History
release 24 hr    
sucralfate 1 gram tablet 1 g PO 4X/DAY 25 History
omeprazole 40 mg capsule,delayed 40 mg PO DAILY 25 08:35 History
release    
rizatriptan 10 mg tablet 10 mg PO PRN PRN migraine 25 Unknown History
cephalexin 500 mg capsule 500 mg PO BID #14 caps 25 20:33 Rx
estradiol 0.5 mg tablet 0.5 mg PO QDAY 25 Unknown History




Allergy/AdvReac Type Severity Reaction Status Date / Time
blueberry Allergy Severe Hives Verified 25 06:33
raspberry Allergy Severe Hives Verified 25 06:33
strawberry (strawberries) Allergy  Swelling Verified 25 06:33


Family History (Reviewed 25 @ 07:25 by Dr. Christophe Preciado MD)
Father
 Alcoholism
 Diabetes
Mother
 Anxiety


Surgical History (Reviewed 25 @ 07:25 by Dr. Christopeh Preciado MD)

History of laparoscopic cholecystectomy
Hx of adenoidectomy
History of bilateral breast reduction surgery
History of hysterectomy
History of  delivery


Social History (Reviewed 25 @ 07:25 by Dr. Christophe Preciado MD)
Smoking Status:  Never smoker 
alcohol intake:  never 
substance use type:  does not use 
additional social history:  no vaping, no marijuana, no edibles, no asa, ibuprofen prn 



Review of Systems (Anesthesia)
ROS Narrative
System reviewed and no additional complaints, except as documented.

## 2025-03-06 NOTE — PCM.HP.BLA
History and Physical
Date of Admission: 03/06/25
The pt is examined and there are no changes to the H&P dated 2/18/25.

Pt for panniculectomy.

She is marked in the pre-op area.  Informed consent is obtained. 

Assessment & Plan
Assessment/Plan
(1) History of weight loss: 
(2) Atrophic skin: 
(3) Intertrigo: 
PLAN: 
Plan
Pt for panniculectomy.